# Patient Record
Sex: MALE | URBAN - METROPOLITAN AREA
[De-identification: names, ages, dates, MRNs, and addresses within clinical notes are randomized per-mention and may not be internally consistent; named-entity substitution may affect disease eponyms.]

---

## 2023-11-14 ENCOUNTER — TELEPHONE (OUTPATIENT)
Dept: ENT CLINIC | Age: 58
End: 2023-11-14

## 2023-11-14 NOTE — TELEPHONE ENCOUNTER
Patient called regarding a bill that he received, he was unsure if it was an estimate or what it was regarding. Also asked if his appt on 11/28 was for a consult or surgery. I called back and advised that what he received was an estimate by insurance and can be disregarded. Also advised that his appt is for a consult, that upon evaluation if his condition is something that can be treated in office we may be able to complete this day of if time allows and provider sees fit, or he will be scheduled for the procedure a different day. Patient noted understanding.

## 2023-11-27 NOTE — PROGRESS NOTES
11/28/23: New Pt here for right nasal mass. First notice 2 months ago, and has slowly grown larger. No pain. States has not interfered with breathing. No change in voice. Drinks 6-8 glasses of water, 2 cups of coffee, and socially drinks alcohol. Weight is stable.

## 2023-11-28 ENCOUNTER — OFFICE VISIT (OUTPATIENT)
Dept: ENT CLINIC | Age: 58
End: 2023-11-28
Payer: COMMERCIAL

## 2023-11-28 VITALS
WEIGHT: 226 LBS | BODY MASS INDEX: 26.68 KG/M2 | DIASTOLIC BLOOD PRESSURE: 77 MMHG | HEART RATE: 59 BPM | HEIGHT: 77 IN | SYSTOLIC BLOOD PRESSURE: 118 MMHG

## 2023-11-28 DIAGNOSIS — L98.0 PYOGENIC GRANULOMA OF SKIN: Primary | ICD-10-CM

## 2023-11-28 DIAGNOSIS — R22.9 SKIN MASS: ICD-10-CM

## 2023-11-28 PROCEDURE — G8484 FLU IMMUNIZE NO ADMIN: HCPCS | Performed by: OTOLARYNGOLOGY

## 2023-11-28 PROCEDURE — G8427 DOCREV CUR MEDS BY ELIG CLIN: HCPCS | Performed by: OTOLARYNGOLOGY

## 2023-11-28 PROCEDURE — 3017F COLORECTAL CA SCREEN DOC REV: CPT | Performed by: OTOLARYNGOLOGY

## 2023-11-28 PROCEDURE — G8419 CALC BMI OUT NRM PARAM NOF/U: HCPCS | Performed by: OTOLARYNGOLOGY

## 2023-11-28 PROCEDURE — 1036F TOBACCO NON-USER: CPT | Performed by: OTOLARYNGOLOGY

## 2023-11-28 PROCEDURE — 99204 OFFICE O/P NEW MOD 45 MIN: CPT | Performed by: OTOLARYNGOLOGY

## 2023-11-28 RX ORDER — ALLOPURINOL 300 MG/1
TABLET ORAL
COMMUNITY
Start: 2023-11-13

## 2023-11-28 NOTE — PATIENT INSTRUCTIONS
SURGERY:_____/_____/_____    Nothing to eat or drink after midnight the night before surgery unless surgery is at [facility] or otherwise instructed by the hospital.    DO NOT TAKE ANY ASPIRIN PRODUCTS 7 days prior to surgery. Tylenol only. No Advil, Motrin, Aleve, or Ibuprofen. IF YOU ARE ON BLOOD THINNERS (PLAVIX, COUMADIN, ELIQUIS ETC) THESE WILL ALSO NEED TO BE HELD. Any illegal drugs in your system (including Marijuana even if legally prescribed) will result in your surgery being cancelled. Please be sure to check with our office or the hospital on time frame for the drugs to be out of your system. SHOULD YOUR INSURANCE CHANGE AT ANY TIME YOU MUST CONTACT OUR OFFICE. FAILURE TO DO SO MAY RESULT IN YOUR SURGERY BEING RESCHEDULED OR YOU MAY BE CHARGED AS SELF-PAY. If you need FMLA or Short Term Disability paperwork completed for your surgery, please complete your portion, ensure your name and date of birth are on them and fax them to [phone] asap. Paperwork can take up to 2 weeks to be completed. If you have any questions or concerns regarding your surgery, please contact the Surgery Scheduler-[facility]  [phone]. If you need medical clearance, you are responsible to contact your physician(s) to schedule an appointment for clearance. If clearance is not completed within 30 days of your surgery it may be cancelled. Our office will fax the appropriate forms that need to be completed to your physician(s). The location of your surgery will call you the day prior to your surgery date to let you know what time you have to be there and any other details. (they usually don't call until late afternoon- early evening.)- IF YOU HAVE QUESTIONS REGARDING THE TIME OF YOUR SURGERY, PLEASE CALL THE FACILITY YOU ARE SCHEDULED AT.            [address] [place], [place] will call you a couple days prior to surgery and give you further instructions, if you have any

## 2023-12-08 ENCOUNTER — TELEPHONE (OUTPATIENT)
Dept: ENT CLINIC | Age: 58
End: 2023-12-08

## 2023-12-08 ENCOUNTER — PREP FOR PROCEDURE (OUTPATIENT)
Dept: ENT CLINIC | Age: 58
End: 2023-12-08

## 2023-12-08 DIAGNOSIS — L98.9 LESION OF SKIN OF NOSE: ICD-10-CM

## 2023-12-08 NOTE — TELEPHONE ENCOUNTER
Prior Authorization Form:      DEMOGRAPHICS:                     Patient Name:  Lorna Madrid  Patient :  1965            Insurance:  Payor: [employer] / Plan: [employer] [address] / Product Type: *No Product type* /   Insurance ID Number:    Payer/Plan Subscr  Sex Relation Sub. Ins. ID Effective Group Num   1.  [facility] 3[birth year]1968 Female Spouse [id number]                                    [address]         DIAGNOSIS & PROCEDURE:                       Procedure/Operation: RIGHT NASAL EXCISION OF SKIN LESION WITH LOCAL TISSUE ARRANGEMENT           CPT Code: 65325, 82064    Diagnosis:  NOSE SKIN LESION    ICD10 Code: L98.9    Location:  [facility]    Surgeon:  DR Fito Emanuel INFORMATION:                          Date: 24    Time: N/A              Anesthesia:  General                                                       Status:  Outpatient        Special Comments:  PATHOLOGY       Electronically signed by Corona Aldana MA on 2023 at 9:35 AM

## 2023-12-27 NOTE — PROGRESS NOTES
[facility] PRE-ADMISSION TESTING INSTRUCTIONS    The Preadmission Testing patient is instructed accordingly using the following criteria (check applicable):    ARRIVAL INSTRUCTIONS:  [x] Parking the day of Surgery is located in the Main Entrance lot.  Upon entering the door, make an immediate right to the surgery reception desk    [x] Bring photo ID and insurance card    [x] Bring in a copy of Living will or Durable Power of  papers.    [x] Please be sure to arrange for responsible adult to provide transportation to and from the hospital    [x] Please arrange for responsible adult to be with you for the 24 hour period post procedure due to having anesthesia    [x] If you awake am of surgery not feeling well or have temperature >100 please call [phone]    GENERAL INSTRUCTIONS:    [x] Nothing by mouth after midnight, including gum, candy, mints or water    [x] You may brush your teeth, but do not swallow any water    [] Take medications as instructed with 1-2 oz of water    [x] Stop herbal supplements and vitamins 5 days prior to procedure    [] Follow preop dosing of blood thinners per physician instructions    [] Take 1/2 dose of evening insulin, but no insulin after midnight    [] No oral diabetic medications after midnight    [] If diabetic and have low blood sugar or feel symptomatic, take 1-2oz apple juice only    [] Bring inhalers day of surgery    [] Bring C-PAP/ Bi-Pap day of surgery    [] Bring urine specimen day of surgery    [x] Shower or bath with soap, lather and rinse well, AM of Surgery, no lotion, powders or creams to surgical site    [] Follow bowel prep as instructed per surgeon    [x] No tobacco products within 24 hours of surgery     [x] No alcohol or illegal drug use within 24 hours of surgery.    [x] Jewelry, body piercing's, eyeglasses, contact lenses and dentures are not permitted into surgery (bring cases)      [x] Please do not wear any nail polish,  make up or hair products on the day of surgery    [x] You can expect a call the business day prior to procedure to notify you if your arrival time changes    [x] If you receive a survey after surgery we would greatly appreciate your comments    [] Parent/guardian of a minor must accompany their child and remain on the premises  the entire time they are under our care     [] Pediatric patients may bring favorite toy, blanket or comfort item with them    [] A caregiver or family member must remain with the patient during their stay if they are mentally handicapped, have dementia, disoriented or unable to use a call light or would be a safety concern if left unattended    [x] Please notify surgeon if you develop any illness between now and time of surgery (cold, cough, sore throat, fever, nausea, vomiting) or any signs of infections  including skin, wounds, and dental.    [x]  The Outpatient Pharmacy is available to fill your prescription here on your day of surgery, ask your preop nurse for details    [] Other instructions    EDUCATIONAL MATERIALS PROVIDED:    [] PAT Preoperative Education Packet/Booklet     [] Medication List    [] Transfusion bracelet applied with instructions    [] Shower with soap, lather and rinse well, and use CHG wipes provided the evening before surgery as instructed    [] Incentive spirometer with instructions

## 2024-01-04 ENCOUNTER — ANESTHESIA EVENT (OUTPATIENT)
Dept: OPERATING ROOM | Age: 59
End: 2024-01-04
Payer: COMMERCIAL

## 2024-01-04 ENCOUNTER — HOSPITAL ENCOUNTER (OUTPATIENT)
Age: 59
Setting detail: OUTPATIENT SURGERY
Discharge: HOME | End: 2024-01-04
Attending: OTOLARYNGOLOGY | Admitting: OTOLARYNGOLOGY
Payer: COMMERCIAL

## 2024-01-04 ENCOUNTER — ANESTHESIA (OUTPATIENT)
Dept: OPERATING ROOM | Age: 59
End: 2024-01-04
Payer: COMMERCIAL

## 2024-01-04 VITALS
HEIGHT: 77 IN | RESPIRATION RATE: 16 BRPM | DIASTOLIC BLOOD PRESSURE: 88 MMHG | WEIGHT: 226 LBS | TEMPERATURE: 96.8 F | SYSTOLIC BLOOD PRESSURE: 124 MMHG | BODY MASS INDEX: 26.68 KG/M2 | OXYGEN SATURATION: 99 % | HEART RATE: 51 BPM

## 2024-01-04 DIAGNOSIS — L98.9 LESION OF SKIN OF NOSE: ICD-10-CM

## 2024-01-04 DIAGNOSIS — G89.18 POST-OP PAIN: Primary | ICD-10-CM

## 2024-01-04 PROCEDURE — 2500000003 HC RX 250 WO HCPCS: Performed by: NURSE ANESTHETIST, CERTIFIED REGISTERED

## 2024-01-04 PROCEDURE — 7100000010 HC PHASE II RECOVERY - FIRST 15 MIN: Performed by: OTOLARYNGOLOGY

## 2024-01-04 PROCEDURE — 88305 TISSUE EXAM BY PATHOLOGIST: CPT

## 2024-01-04 PROCEDURE — 2580000003 HC RX 258: Performed by: STUDENT IN AN ORGANIZED HEALTH CARE EDUCATION/TRAINING PROGRAM

## 2024-01-04 PROCEDURE — 3700000000 HC ANESTHESIA ATTENDED CARE: Performed by: OTOLARYNGOLOGY

## 2024-01-04 PROCEDURE — 6360000002 HC RX W HCPCS: Performed by: NURSE ANESTHETIST, CERTIFIED REGISTERED

## 2024-01-04 PROCEDURE — 3600000003 HC SURGERY LEVEL 3 BASE: Performed by: OTOLARYNGOLOGY

## 2024-01-04 PROCEDURE — 3600000013 HC SURGERY LEVEL 3 ADDTL 15MIN: Performed by: OTOLARYNGOLOGY

## 2024-01-04 PROCEDURE — 2709999900 HC NON-CHARGEABLE SUPPLY: Performed by: OTOLARYNGOLOGY

## 2024-01-04 PROCEDURE — 6370000000 HC RX 637 (ALT 250 FOR IP)

## 2024-01-04 PROCEDURE — 7100000011 HC PHASE II RECOVERY - ADDTL 15 MIN: Performed by: OTOLARYNGOLOGY

## 2024-01-04 PROCEDURE — 14061 TIS TRNFR E/N/E/L10.1-30SQCM: CPT | Performed by: OTOLARYNGOLOGY

## 2024-01-04 PROCEDURE — 2500000003 HC RX 250 WO HCPCS: Performed by: OTOLARYNGOLOGY

## 2024-01-04 PROCEDURE — 2580000003 HC RX 258: Performed by: NURSE ANESTHETIST, CERTIFIED REGISTERED

## 2024-01-04 PROCEDURE — 6360000002 HC RX W HCPCS: Performed by: STUDENT IN AN ORGANIZED HEALTH CARE EDUCATION/TRAINING PROGRAM

## 2024-01-04 PROCEDURE — 3700000001 HC ADD 15 MINUTES (ANESTHESIA): Performed by: OTOLARYNGOLOGY

## 2024-01-04 RX ORDER — LIDOCAINE HYDROCHLORIDE 20 MG/ML
INJECTION, SOLUTION INFILTRATION; PERINEURAL PRN
Status: DISCONTINUED | OUTPATIENT
Start: 2024-01-04 | End: 2024-01-04 | Stop reason: SDUPTHER

## 2024-01-04 RX ORDER — SODIUM CHLORIDE 0.9 % (FLUSH) 0.9 %
5-40 SYRINGE (ML) INJECTION PRN
Status: DISCONTINUED | OUTPATIENT
Start: 2024-01-04 | End: 2024-01-04 | Stop reason: HOSPADM

## 2024-01-04 RX ORDER — SODIUM CHLORIDE 9 MG/ML
INJECTION, SOLUTION INTRAVENOUS CONTINUOUS PRN
Status: DISCONTINUED | OUTPATIENT
Start: 2024-01-04 | End: 2024-01-04 | Stop reason: SDUPTHER

## 2024-01-04 RX ORDER — DIPHENHYDRAMINE HYDROCHLORIDE 50 MG/ML
12.5 INJECTION INTRAMUSCULAR; INTRAVENOUS
Status: DISCONTINUED | OUTPATIENT
Start: 2024-01-04 | End: 2024-01-04 | Stop reason: HOSPADM

## 2024-01-04 RX ORDER — FENTANYL CITRATE 50 UG/ML
INJECTION, SOLUTION INTRAMUSCULAR; INTRAVENOUS PRN
Status: DISCONTINUED | OUTPATIENT
Start: 2024-01-04 | End: 2024-01-04 | Stop reason: SDUPTHER

## 2024-01-04 RX ORDER — ONDANSETRON 4 MG/1
4 TABLET, FILM COATED ORAL EVERY 6 HOURS
Qty: 12 TABLET | Refills: 0 | Status: SHIPPED | OUTPATIENT
Start: 2024-01-04 | End: 2024-01-07

## 2024-01-04 RX ORDER — SODIUM CHLORIDE 0.9 % (FLUSH) 0.9 %
5-40 SYRINGE (ML) INJECTION EVERY 12 HOURS SCHEDULED
Status: DISCONTINUED | OUTPATIENT
Start: 2024-01-04 | End: 2024-01-04 | Stop reason: HOSPADM

## 2024-01-04 RX ORDER — HYDROCODONE BITARTRATE AND ACETAMINOPHEN 5; 325 MG/1; MG/1
1 TABLET ORAL EVERY 6 HOURS PRN
Qty: 12 TABLET | Refills: 0 | Status: SHIPPED | OUTPATIENT
Start: 2024-01-04 | End: 2024-01-07

## 2024-01-04 RX ORDER — MEPERIDINE HYDROCHLORIDE 25 MG/ML
12.5 INJECTION INTRAMUSCULAR; INTRAVENOUS; SUBCUTANEOUS EVERY 5 MIN PRN
Status: DISCONTINUED | OUTPATIENT
Start: 2024-01-04 | End: 2024-01-04 | Stop reason: HOSPADM

## 2024-01-04 RX ORDER — CEFAZOLIN 2 G/1
INJECTION, POWDER, FOR SOLUTION INTRAMUSCULAR; INTRAVENOUS
Status: DISPENSED
Start: 2024-01-04 | End: 2024-01-04

## 2024-01-04 RX ORDER — MIDAZOLAM HYDROCHLORIDE 1 MG/ML
INJECTION INTRAMUSCULAR; INTRAVENOUS PRN
Status: DISCONTINUED | OUTPATIENT
Start: 2024-01-04 | End: 2024-01-04 | Stop reason: SDUPTHER

## 2024-01-04 RX ORDER — TRAMADOL HYDROCHLORIDE 50 MG/1
50 TABLET ORAL PRN
Status: DISCONTINUED | OUTPATIENT
Start: 2024-01-04 | End: 2024-01-04 | Stop reason: HOSPADM

## 2024-01-04 RX ORDER — PROPOFOL 10 MG/ML
INJECTION, EMULSION INTRAVENOUS CONTINUOUS PRN
Status: DISCONTINUED | OUTPATIENT
Start: 2024-01-04 | End: 2024-01-04 | Stop reason: SDUPTHER

## 2024-01-04 RX ORDER — SODIUM CHLORIDE 9 MG/ML
INJECTION, SOLUTION INTRAVENOUS PRN
Status: DISCONTINUED | OUTPATIENT
Start: 2024-01-04 | End: 2024-01-04 | Stop reason: HOSPADM

## 2024-01-04 RX ORDER — LIDOCAINE HYDROCHLORIDE AND EPINEPHRINE 10; 10 MG/ML; UG/ML
INJECTION, SOLUTION INFILTRATION; PERINEURAL PRN
Status: DISCONTINUED | OUTPATIENT
Start: 2024-01-04 | End: 2024-01-04 | Stop reason: ALTCHOICE

## 2024-01-04 RX ORDER — GLYCOPYRROLATE 0.2 MG/ML
INJECTION INTRAMUSCULAR; INTRAVENOUS PRN
Status: DISCONTINUED | OUTPATIENT
Start: 2024-01-04 | End: 2024-01-04 | Stop reason: SDUPTHER

## 2024-01-04 RX ORDER — TRAMADOL HYDROCHLORIDE 50 MG/1
100 TABLET ORAL PRN
Status: DISCONTINUED | OUTPATIENT
Start: 2024-01-04 | End: 2024-01-04 | Stop reason: HOSPADM

## 2024-01-04 RX ORDER — SODIUM CHLORIDE, SODIUM LACTATE, POTASSIUM CHLORIDE, CALCIUM CHLORIDE 600; 310; 30; 20 MG/100ML; MG/100ML; MG/100ML; MG/100ML
INJECTION, SOLUTION INTRAVENOUS CONTINUOUS
Status: DISCONTINUED | OUTPATIENT
Start: 2024-01-04 | End: 2024-01-04 | Stop reason: HOSPADM

## 2024-01-04 RX ADMIN — FENTANYL CITRATE 50 MCG: 50 INJECTION, SOLUTION INTRAMUSCULAR; INTRAVENOUS at 09:02

## 2024-01-04 RX ADMIN — PROPOFOL 70 MCG/KG/MIN: 10 INJECTION, EMULSION INTRAVENOUS at 09:02

## 2024-01-04 RX ADMIN — LIDOCAINE HYDROCHLORIDE 40 MG: 20 INJECTION, SOLUTION INFILTRATION; PERINEURAL at 09:02

## 2024-01-04 RX ADMIN — SODIUM CHLORIDE: 9 INJECTION, SOLUTION INTRAVENOUS at 08:53

## 2024-01-04 RX ADMIN — FENTANYL CITRATE 50 MCG: 50 INJECTION, SOLUTION INTRAMUSCULAR; INTRAVENOUS at 09:18

## 2024-01-04 RX ADMIN — WATER 2000 MG: 1 INJECTION INTRAMUSCULAR; INTRAVENOUS; SUBCUTANEOUS at 09:00

## 2024-01-04 RX ADMIN — GLYCOPYRROLATE 0.2 MG: 0.2 INJECTION INTRAMUSCULAR; INTRAVENOUS at 09:00

## 2024-01-04 RX ADMIN — MIDAZOLAM 2 MG: 1 INJECTION INTRAMUSCULAR; INTRAVENOUS at 08:55

## 2024-01-04 ASSESSMENT — PAIN - FUNCTIONAL ASSESSMENT: PAIN_FUNCTIONAL_ASSESSMENT: NONE - DENIES PAIN

## 2024-01-04 NOTE — H&P
Zya Alamo was seen and re-examined preoperatively today, January 4, 2024.  There was no substantial change in his physical and medical status. All Meds and Family/Social/Previous history was reviewed and there were no significant changes. Patient is fit for the proposed surgical procedure.  All questions were appropriately addressed and had no further questions regarding the risks, benefits, and alternatives of the procedure.  Zay Alamo and family wished to proceed.    Chapo Nunez DO  Resident Physician  [facility]  Otolaryngology Residency  1/4/2024  8:31 AM

## 2024-01-04 NOTE — ANESTHESIA PRE PROCEDURE
Department of Anesthesiology  Preprocedure Note       Name:  Zay Alamo   Age:  58 y.o.  :  1965                                          MRN:  [id number]         Date:  2024      Surgeon: Surgeon(s):  Uche Barnes MD    Procedure: Procedure(s):  RIGHT NASAL EXCISION OF SKIN LESION AND LOCAL TISSUE ARRANGEMENT   ++IODINE ALLERGY++    Medications prior to admission:   Prior to Admission medications    Medication Sig Start Date End Date Taking? Authorizing Provider   allopurinol (ZYLOPRIM) 300 MG tablet take 1 tablet by mouth daily with A LARGE GLASS OF WATER 23   Provider, MD Monik       Current medications:    Current Facility-Administered Medications   Medication Dose Route Frequency Provider Last Rate Last Admin    lactated ringers IV soln infusion   IntraVENous Continuous Chapo Nunez DO        sodium chloride flush 0.9 % injection 5-40 mL  5-40 mL IntraVENous 2 times per day Chapo Nunez,         sodium chloride flush 0.9 % injection 5-40 mL  5-40 mL IntraVENous PRN Chapo Nunez,         0.9 % sodium chloride infusion   IntraVENous PRN Chapo Nunez DO        ceFAZolin (ANCEF) 2,000 mg in sterile water 20 mL IV syringe  2,000 mg IntraVENous On Call to OR Chapo Nunez DO           Allergies:    Allergies   Allergen Reactions    Povidone-Iodine Hives       Problem List:    Patient Active Problem List   Diagnosis Code    Lesion of skin of nose L98.9       Past Medical History:        Diagnosis Date    Arthritis     Lesion of skin of nose        Past Surgical History:        Procedure Laterality Date    WISDOM TOOTH EXTRACTION         Social History:    Social History     Tobacco Use    Smoking status: Never    Smokeless tobacco: Never   Substance Use Topics    Alcohol use: Yes     Comment: socially                                Counseling given: Not Answered      Vital Signs (Current):   Vitals:    23 1556   Weight: 102.5 kg (226 lb)   Height:

## 2024-01-04 NOTE — DISCHARGE INSTRUCTIONS
Otolaryngology (ENT) Post-Op Instructions    Follow-up with Dr. Barnes  in one week(s). Please call office to schedule and confirm your appointment. Please follow the instructions below:    DIET INSTRUCTIONS:  Regular diet.     ACTIVITY INSTRUCTIONS:  Increase activity as tolerated    Elevate Head of bed   No heavy lifting or strenuous activity     No driving while taking pain medication    WOUND/DRESSING INSTRUCTIONS:  May shower, but avoid baths, swimming pools or hot tubes until your wound is healed   You may clean your wound with baby wipe or gently let water run over area in shower. Gently dab area to dry and keep steri strips in place until follow up appointment  You may also cover your wound(s) with hydrogels, hydrofibers, alginates, or soft silicone dressing  For best wound-healing and cosmetic results:  Use sunscreen of at least SPF 30 on your wound(s) when outdoor  Again, always keep wound(s) moist or wet with ointment  Sutures/Staples to be removed in the office   MEDICATION INSTRUCTIONS:  Take medication as prescribed.  When taking pain medications, you may experience dizziness or drowsiness.  Do not drink alcohol or drive when taking these medications.  You may take Ibuprofen (over the counter) as per directions for mild pain.  Do not take any other acetaminophen (Tylenol) products while taking your pain medication.  You may experience constipation while taking pain medication - You may take over the counter stool softeners: docuscate (Colace) or sennosides S (Senokot - S).     Call physician for any of the following or for questions/concerns:   Fever over 101° F    Redness, swelling, hardness or warmth at the wound site (s)  Unrelieved nausea/vomiting    Foul smelling or cloudy drainage at the wound site (s)   Unrelieved pain or increase in pain     Increase in shortness of breath

## 2024-01-04 NOTE — OP NOTE
Operative Note      Patient: Zay Alamo  YOB: 1965  MRN: [id number]    Date of Procedure: 1/4/2024    Pre-Op Diagnosis Codes:     * Lesion of skin of nose [L98.9]    Post-Op Diagnosis: Same       Procedure(s):  RIGHT NASAL EXCISION OF SKIN LESION AND LOCAL TISSUE ARRANGEMENT   ++IODINE ALLERGY++  Advancement flap 12 cm sq    Surgeon(s):  Uche Barnes MD    Assistant:   Surgical Assistant: Petra Helm  Resident: Chapo Nunez DO    Anesthesia: General    Estimated Blood Loss (mL): less than 50     Complications: None    Specimens:   ID Type Source Tests Collected by Time Destination   A : nasal lesion Tissue Tissue SURGICAL PATHOLOGY Uche Barnes MD 1/4/2024 0930        Implants:  * No implants in log *      Drains: * No LDAs found *    Findings: right nasolateralsupero pyogenic granuloma biopsy documented lesion with central pore and granulation like thickened tissue below pore that was excised.      Detailed Description of Procedure:     INDICATIONS FOR PROCEDURE nasal skin mass pos   The patient was taken to Operating Room , identified as Zay Alamo and the procedure verified  The patient was prepped and draped in sterile fashion and MAC anesthesia was used for the case. . An of relaxed skin tension lines were marked in preop and 5 cc of local injected into area with incision was made with the incision extending between two relaxed lines to include the central pore and a margin of normal tissue about 4x10 mm with extension down to muscle to include the white granulation like tissue deep to the pore included. Then undermined starting inferiorly and working superiorly to fully excise the area.  Could not be closed primarily so a back cut was made to remove a dog ear and the tissue undermined in all directions for a total of 3x4 cm (12 cm sq) and then closed with 4.0 monocryl deep dermal sutures and then 5.0 fast gut running locking for skin after Hemostasis controlled with  bipolar cautery.  . Bleeding was minimal and was treated with bipolar cautery.      Pt tolerated well.   Transferred to anesthesia care and then back to pacu in good state.     Electronically signed by MADAI PALMER MD on 1/4/2024 at 9:43 AM

## 2024-01-04 NOTE — H&P
History of Present Illness-   11/28/23: New Pt here for right nasal mass. First notice 2 months ago, and has slowly grown larger. No pain. States has not interfered with breathing. No Bleeding from the lesion and no pain from the lesion. Previous biopsy done by Dermatology 10/26/23 that resulted as pyogenic granuloma. No change in voice. Drinks 6-8 glasses of water, 2 cups of coffee, and socially drinks alcohol. Weight is stable. Non smoker.  No hormone replacement.     Past medical, family and social history were reviewed per the patient's chart    Review of Systems- No drainage, discharge, or headache.  Complete 10 system ROS completed and negative except as noted above.     Physical Examination-   Vital Signs-/77   Pulse 59   Ht 1.956 m (6' 5\")   Wt 102.5 kg (226 lb)   BMI 26.80 kg/m²     Ears- Tympanic membranes clear bilaterally.  No middle ear effusion.  No pre or post auricular tenderness.  Nose- Nasal mucosa clear and dry.  No significant septal deviation or inferior turbinate hypertrophy. + external lesion on right nare   Oral Cavity/Oropharynx- Floor of mouth and tongue are soft and nontender.  No posterior pharyngeal erythema. + gag reflex  Neck- Soft and nontender.  No masses, lesions, lymphadenopathy, or thyroid nodules appreciated.   Cranial Nerve- Cranial nerves II to XII intact.  Extraocular muscles intact.  No gross motor visual deficits.  No spontaneous nystagmus.    Face- No facial skin tenderness to palpation.  Heart- No cyanosis, regular  Lungs- No stridor, no intercostal accessory muscle use  General- The patient is in no acute distress. A&O x3       Medical Decision Making and Treatment Plan.     1. Plan today to treat the skin mass was to review available records- done- pyogenic granuloma  2. Reviewed anatomy and treatment options of the skin mass   3. Recommend an excision to finish his workup   4. Plan for fu after excision

## 2024-01-04 NOTE — ANESTHESIA POSTPROCEDURE EVALUATION
Department of Anesthesiology  Postprocedure Note    Patient: Zay Alamo  MRN: [id number]  YOB: 1965  Date of evaluation: 1/4/2024    Procedure Summary       Date: 01/04/24 Room / Location: [address]    Anesthesia Start: 0853 Anesthesia Stop: 1000    Procedure: RIGHT NASAL EXCISION OF SKIN LESION AND LOCAL TISSUE ARRANGEMENT   ++IODINE ALLERGY++ (Right: Nose) Diagnosis:       Lesion of skin of nose      (Lesion of skin of nose [L98.9])    Surgeons: Uche Barnes MD Responsible Provider: Benjy Stoll MD    Anesthesia Type: MAC ASA Status: 2            Anesthesia Type: No value filed.    Brooks Phase I: Brooks Score: 10    Brooks Phase II:      Anesthesia Post Evaluation    Patient location during evaluation: PACU (phase 2 pacu)  Patient participation: complete - patient participated  Level of consciousness: awake and alert  Airway patency: patent  Nausea & Vomiting: no nausea and no vomiting  Cardiovascular status: hemodynamically stable  Respiratory status: spontaneous ventilation and room air  Hydration status: stable  Pain management: adequate        No notable events documented.

## 2024-01-08 LAB — SURGICAL PATHOLOGY REPORT: NORMAL

## 2024-01-10 NOTE — PROGRESS NOTES
CLINICAL PHARMACY NOTE: MEDS TO BEDS    Total # of Prescriptions Filled: 2   The following medications were delivered to the patient:  Norco 7.5/325 mg    Additional Documentation:

## 2024-01-11 ENCOUNTER — OFFICE VISIT (OUTPATIENT)
Dept: ENT CLINIC | Age: 59
End: 2024-01-11

## 2024-01-11 VITALS
BODY MASS INDEX: 27.16 KG/M2 | HEIGHT: 77 IN | WEIGHT: 230 LBS | DIASTOLIC BLOOD PRESSURE: 84 MMHG | HEART RATE: 53 BPM | SYSTOLIC BLOOD PRESSURE: 125 MMHG

## 2024-01-11 DIAGNOSIS — L98.9 LESION OF SKIN OF NOSE: Primary | ICD-10-CM

## 2024-01-11 PROCEDURE — 99024 POSTOP FOLLOW-UP VISIT: CPT | Performed by: OTOLARYNGOLOGY

## 2024-01-11 NOTE — PROGRESS NOTES
Dear Dr Chin, Rolando Mahan, DO No ref. provider found     We had the pleasure of seeing Zay Alamo, 1965 here    on 1/11/2024  Please see below for review of care and plans.     Chief complaint- No diagnosis found.      1/4/24 - RIGHT NASAL EXCISION OF SKIN LESION AND LOCAL TISSUE ARRANGEMENT       History of Present Illness-   11/28/23: New Pt here for right nasal mass. First notice 2 months ago, and has slowly grown larger. No pain. States has not interfered with breathing. No Bleeding from the lesion and no pain from the lesion. Previous biopsy done by Dermatology 10/26/23 that resulted as pyogenic granuloma. No change in voice. Drinks 6-8 glasses of water, 2 cups of coffee, and socially drinks alcohol. Weight is stable. Non smoker.  No hormone replacement.     1/11/24: Pt here for post op right nasal excision of skin lesion. No pain. No difficulty swallowing. No change in voice. Drinks 6-8 glasses of water, 2 cups of coffee, and socially drinks alcohol. Weight is stable.    Review of Systems- No drainage, discharge, or headache.  Complete 10 system ROS completed and negative except as noted above.     Physical Examination-   Vital Signs-There were no vitals taken for this visit.    Ears- Tympanic membranes clear bilaterally.  No middle ear effusion.  No pre or post auricular tenderness.  Nose- Nasal mucosa clear and dry.  No significant septal deviation or inferior turbinate hypertrophy. + external lesion on right nare - well helaed site  Oral Cavity/Oropharynx- Floor of mouth and tongue are soft and nontender.  No posterior pharyngeal erythema. + gag reflex  Neck- Soft and nontender.  No masses, lesions, lymphadenopathy, or thyroid nodules appreciated.   Cranial Nerve- Cranial nerves II to XII intact.  Extraocular muscles intact.  No gross motor visual deficits.  No spontaneous nystagmus.    Face- No facial skin tenderness to palpation.  Heart- No cyanosis, regular  Lungs- No stridor, no

## 2024-01-11 NOTE — PATIENT INSTRUCTIONS
Rule of two    - Take two fingers and massage site in a circular motion for two minutes.     - Do it twice a day.

## 2024-03-26 ENCOUNTER — OFFICE VISIT (OUTPATIENT)
Dept: ENT CLINIC | Age: 59
End: 2024-03-26

## 2024-03-26 VITALS
BODY MASS INDEX: 26.92 KG/M2 | HEART RATE: 50 BPM | HEIGHT: 77 IN | WEIGHT: 228 LBS | DIASTOLIC BLOOD PRESSURE: 73 MMHG | SYSTOLIC BLOOD PRESSURE: 118 MMHG

## 2024-03-26 DIAGNOSIS — L98.9 LESION OF SKIN OF NOSE: Primary | ICD-10-CM

## 2024-03-26 PROCEDURE — 99024 POSTOP FOLLOW-UP VISIT: CPT | Performed by: OTOLARYNGOLOGY

## 2024-03-26 NOTE — PROGRESS NOTES
3/26/24: Pt here for nasal suture removal, had surface suture removed but are unsure if the ones internally are disolving properly. No pain. No difficulty swallowing. No change in voice. Drinks 6-8 glasses of water, 2 cups of coffee, and socially drinks alcohol. Weight is stable.

## 2024-03-26 NOTE — PROGRESS NOTES
Dear Dr Chin, Rolando Mahan, DO No ref. provider found     We had the pleasure of seeing Zay Alamo, 1965 here    on 3/26/2024  Please see below for review of care and plans.     Chief complaint- No diagnosis found.      1/4/24 - RIGHT NASAL EXCISION OF SKIN LESION AND LOCAL TISSUE ARRANGEMENT       History of Present Illness-   11/28/23: New Pt here for right nasal mass. First notice 2 months ago, and has slowly grown larger. No pain. States has not interfered with breathing. No Bleeding from the lesion and no pain from the lesion. Previous biopsy done by Dermatology 10/26/23 that resulted as pyogenic granuloma. No change in voice. Drinks 6-8 glasses of water, 2 cups of coffee, and socially drinks alcohol. Weight is stable. Non smoker.  No hormone replacement.     1/11/24: Pt here for post op right nasal excision of skin lesion. No pain. No difficulty swallowing. No change in voice. Drinks 6-8 glasses of water, 2 cups of coffee, and socially drinks alcohol. Weight is stable.    3/26/24: Pt here for nasal suture removal, had surface suture removed but are unsure if the ones internally are disolving properly. No pain. No difficulty swallowing. No change in voice. Drinks 6-8 glasses of water, 2 cups of coffee, and socially drinks alcohol. Weight is stable.     Review of Systems- No drainage, discharge, or headache.  Complete 10 system ROS completed and negative except as noted above.     Physical Examination-   Vital Signs-/73   Pulse 50   Ht 1.956 m (6' 5\")   Wt 103.4 kg (228 lb)   BMI 27.04 kg/m²     Ears- Tympanic membranes clear bilaterally.  No middle ear effusion.  No pre or post auricular tenderness.  Nose- Nasal mucosa clear and dry.  No significant septal deviation or inferior turbinate hypertrophy. + external lesion on right nare - well helaed site update 3/26/24: right nasal bridge with 2 separate 1 mm raised edges- used .5 pickups and scissors to open up area and found no

## 2024-12-17 ENCOUNTER — TELEPHONE (OUTPATIENT)
Dept: PULMONOLOGY | Age: 59
End: 2024-12-17

## 2024-12-17 ENCOUNTER — OFFICE VISIT (OUTPATIENT)
Dept: PULMONOLOGY | Age: 59
End: 2024-12-17
Payer: COMMERCIAL

## 2024-12-17 VITALS
HEART RATE: 71 BPM | HEIGHT: 77 IN | WEIGHT: 228 LBS | TEMPERATURE: 98.4 F | DIASTOLIC BLOOD PRESSURE: 76 MMHG | BODY MASS INDEX: 26.92 KG/M2 | OXYGEN SATURATION: 99 % | SYSTOLIC BLOOD PRESSURE: 120 MMHG

## 2024-12-17 DIAGNOSIS — G47.33 OSA (OBSTRUCTIVE SLEEP APNEA): ICD-10-CM

## 2024-12-17 DIAGNOSIS — J45.909 UNCOMPLICATED ASTHMA, UNSPECIFIED ASTHMA SEVERITY, UNSPECIFIED WHETHER PERSISTENT: Primary | ICD-10-CM

## 2024-12-17 PROBLEM — R73.9 HYPERGLYCEMIA: Status: ACTIVE | Noted: 2023-09-22

## 2024-12-17 PROBLEM — I34.0 MITRAL VALVE REGURGITATION: Status: ACTIVE | Noted: 2023-09-22

## 2024-12-17 PROBLEM — E78.5 HYPERLIPIDEMIA: Status: ACTIVE | Noted: 2023-09-22

## 2024-12-17 PROBLEM — I33.0 AORTIC VALVE VEGETATION: Status: ACTIVE | Noted: 2023-09-22

## 2024-12-17 PROCEDURE — 99203 OFFICE O/P NEW LOW 30 MIN: CPT | Performed by: INTERNAL MEDICINE

## 2024-12-17 PROCEDURE — G8427 DOCREV CUR MEDS BY ELIG CLIN: HCPCS | Performed by: INTERNAL MEDICINE

## 2024-12-17 PROCEDURE — G8484 FLU IMMUNIZE NO ADMIN: HCPCS | Performed by: INTERNAL MEDICINE

## 2024-12-17 PROCEDURE — 1036F TOBACCO NON-USER: CPT | Performed by: INTERNAL MEDICINE

## 2024-12-17 PROCEDURE — G8419 CALC BMI OUT NRM PARAM NOF/U: HCPCS | Performed by: INTERNAL MEDICINE

## 2024-12-17 PROCEDURE — 3017F COLORECTAL CA SCREEN DOC REV: CPT | Performed by: INTERNAL MEDICINE

## 2024-12-17 RX ORDER — IRBESARTAN 150 MG/1
150 TABLET ORAL DAILY
COMMUNITY
Start: 2024-12-08

## 2024-12-17 RX ORDER — AZITHROMYCIN 250 MG/1
250 TABLET, FILM COATED ORAL DAILY
COMMUNITY
Start: 2024-10-28

## 2024-12-17 RX ORDER — PREDNISONE 10 MG/1
10 TABLET ORAL DAILY
COMMUNITY
Start: 2024-11-07

## 2024-12-17 RX ORDER — DOXYCYCLINE 100 MG/1
100 CAPSULE ORAL DAILY
COMMUNITY
Start: 2024-10-17

## 2024-12-17 RX ORDER — IPRATROPIUM BROMIDE AND ALBUTEROL SULFATE 2.5; .5 MG/3ML; MG/3ML
1 SOLUTION RESPIRATORY (INHALATION) EVERY 4 HOURS PRN
COMMUNITY
Start: 2024-11-12

## 2024-12-17 RX ORDER — ATORVASTATIN CALCIUM 20 MG/1
20 TABLET, FILM COATED ORAL DAILY
COMMUNITY
Start: 2024-12-16

## 2024-12-17 RX ORDER — ALBUTEROL SULFATE 90 UG/1
2 INHALANT RESPIRATORY (INHALATION) EVERY 4 HOURS PRN
COMMUNITY
Start: 2024-10-28

## 2024-12-17 RX ORDER — FLUTICASONE FUROATE, UMECLIDINIUM BROMIDE AND VILANTEROL TRIFENATATE 100; 62.5; 25 UG/1; UG/1; UG/1
1 POWDER RESPIRATORY (INHALATION) DAILY
COMMUNITY

## 2024-12-17 RX ORDER — FLUTICASONE PROPIONATE AND SALMETEROL 250; 50 UG/1; UG/1
1 POWDER RESPIRATORY (INHALATION) 2 TIMES DAILY
COMMUNITY
Start: 2024-11-12

## 2024-12-17 ASSESSMENT — ENCOUNTER SYMPTOMS
COUGH: 1
WHEEZING: 1
EYES NEGATIVE: 1
SHORTNESS OF BREATH: 1
GASTROINTESTINAL NEGATIVE: 1

## 2024-12-17 NOTE — TELEPHONE ENCOUNTER
Mailed letter to patient to inform him of the PFT that is scheduled for him  at [place], [place] . This test is scheduled on Monday, January 6, 2025 at  7:30 am. Please arrive 15 minutes prior to appointment time. The prep for this test is to not have any caffeine for 24 hours prior to testing and no respiratory medications for at least 4 hours prior to testing time.

## 2024-12-17 NOTE — PROGRESS NOTES
Progress Note    Zay Alamo  1965    Shortness of Breath       HPI     Past Medical History:   Diagnosis Date    Arthritis     Lesion of skin of nose       Past Surgical History:   Procedure Laterality Date    NOSE SURGERY Right 1/4/2024    RIGHT NASAL EXCISION OF SKIN LESION AND LOCAL TISSUE ARRANGEMENT performed by Uche Barnes MD at [facility]    WISDOM TOOTH EXTRACTION        No family history on file.   Social History     Socioeconomic History    Marital status: Unknown     Spouse name: None    Number of children: None    Years of education: None    Highest education level: None   Tobacco Use    Smoking status: Never    Smokeless tobacco: Never   Vaping Use    Vaping status: Never Used   Substance and Sexual Activity    Alcohol use: Yes     Comment: socially    Drug use: Never    Sexual activity: Defer        Povidone-iodine     Current Outpatient Medications:     albuterol sulfate HFA (PROVENTIL;VENTOLIN;PROAIR) 108 (90 Base) MCG/ACT inhaler, Inhale 2 puffs into the lungs every 4 hours as needed for Wheezing or Shortness of Breath, Disp: , Rfl:     atorvastatin (LIPITOR) 20 MG tablet, Take 1 tablet by mouth daily, Disp: , Rfl:     azithromycin (ZITHROMAX) 250 MG tablet, Take 1 tablet by mouth daily, Disp: , Rfl:     doxycycline hyclate (VIBRAMYCIN) 100 MG capsule, Take 1 capsule by mouth daily, Disp: , Rfl:     ipratropium 0.5 mg-albuterol 2.5 mg (DUONEB) 0.5-2.5 (3) MG/3ML SOLN nebulizer solution, Take 3 mLs by nebulization every 4 hours as needed, Disp: , Rfl:     fluticasone-salmeterol (ADVAIR) 250-50 MCG/ACT AEPB diskus inhaler, Inhale 1 puff into the lungs 2 times daily, Disp: , Rfl:     predniSONE (DELTASONE) 10 MG tablet, Take 1 tablet by mouth daily, Disp: , Rfl:     irbesartan (AVAPRO) 150 MG tablet, Take 1 tablet by mouth daily, Disp: , Rfl:     allopurinol (ZYLOPRIM) 300 MG tablet, take 1 tablet by mouth daily with A LARGE GLASS OF WATER, Disp: , Rfl:     fluticasone-umeclidin-vilant 
are equal, round, and reactive to light.   Cardiovascular:      Rate and Rhythm: Normal rate and regular rhythm.      Pulses: Normal pulses.      Heart sounds: Normal heart sounds.   Pulmonary:      Effort: Pulmonary effort is normal.      Breath sounds: Normal breath sounds.   Abdominal:      General: Bowel sounds are normal.      Palpations: Abdomen is soft.   Musculoskeletal:         General: Normal range of motion.      Cervical back: Normal range of motion and neck supple.   Skin:     General: Skin is warm.   Neurological:      General: No focal deficit present.      Mental Status: He is alert and oriented to person, place, and time.          Assessment/Plan:   Diagnosis Orders   1. Uncomplicated asthma, unspecified asthma severity, unspecified whether persistent  Full PFT Study With Bronchodilator      2. TERRIE (obstructive sleep apnea)          Continue Advair inhaler, PFT ordered, will review his HSAT and then decide about PAP therapy.    Follow up in 4 weeks.     Electronically by Katie Portillo MD  on 12/17/24 at 2:41 PM EST

## 2024-12-18 DIAGNOSIS — G47.33 OSA (OBSTRUCTIVE SLEEP APNEA): Primary | ICD-10-CM

## 2025-01-02 ENCOUNTER — TELEPHONE (OUTPATIENT)
Dept: PULMONOLOGY | Age: 60
End: 2025-01-02

## 2025-01-02 NOTE — TELEPHONE ENCOUNTER
Pt called in to see if we received the PFT that he had done 2 months ago. I advised the pt we received it and scanned it into his chart (media). He is wondering if he needs to have the PFT done that was ordered for him since he just had one done? He would like a call back ASAP to know if he needs to go to his PFT appt on 1/6/25.     Pt also stated that [facility] contacted him regarding his sleep mask and won't issue one to him unless there is a second diagnosis code listed. Please advise.

## 2025-01-03 ENCOUNTER — TELEPHONE (OUTPATIENT)
Dept: ENT CLINIC | Age: 60
End: 2025-01-03

## 2025-01-13 ENCOUNTER — TELEPHONE (OUTPATIENT)
Dept: PULMONOLOGY | Age: 60
End: 2025-01-13

## 2025-01-13 NOTE — TELEPHONE ENCOUNTER
Call to pt's PCP requesting additional medical records to support need for sleep study. Pt's insurance requiring additional diagnosis for billing of CPAP device and supplies. Emily called and is requesting additional dx due to AHI requirements. Left VM for PCP Dr Chin's office asking for call back or faxing of additional medical records needed.

## 2025-01-28 ENCOUNTER — OFFICE VISIT (OUTPATIENT)
Dept: PULMONOLOGY | Age: 60
End: 2025-01-28
Payer: COMMERCIAL

## 2025-01-28 VITALS
SYSTOLIC BLOOD PRESSURE: 113 MMHG | OXYGEN SATURATION: 97 % | HEART RATE: 65 BPM | TEMPERATURE: 97.5 F | DIASTOLIC BLOOD PRESSURE: 65 MMHG | RESPIRATION RATE: 12 BRPM

## 2025-01-28 DIAGNOSIS — J44.9 CHRONIC OBSTRUCTIVE PULMONARY DISEASE, UNSPECIFIED COPD TYPE (HCC): Primary | ICD-10-CM

## 2025-01-28 PROCEDURE — G8427 DOCREV CUR MEDS BY ELIG CLIN: HCPCS | Performed by: INTERNAL MEDICINE

## 2025-01-28 PROCEDURE — 3017F COLORECTAL CA SCREEN DOC REV: CPT | Performed by: INTERNAL MEDICINE

## 2025-01-28 PROCEDURE — 3023F SPIROM DOC REV: CPT | Performed by: INTERNAL MEDICINE

## 2025-01-28 PROCEDURE — G8419 CALC BMI OUT NRM PARAM NOF/U: HCPCS | Performed by: INTERNAL MEDICINE

## 2025-01-28 PROCEDURE — 99213 OFFICE O/P EST LOW 20 MIN: CPT | Performed by: INTERNAL MEDICINE

## 2025-01-28 PROCEDURE — 1036F TOBACCO NON-USER: CPT | Performed by: INTERNAL MEDICINE

## 2025-01-28 NOTE — PROGRESS NOTES
Progress Note    Zay Alamo  1965    CC:Follow-up (6 week follow up)       HPI : 59 year old male has been on PAP therapy for TERRIE, average use is 5 1/2 hours a night, AHI 4.3 and median mask leak is 3.5. He is getting used to PAP therapy and still has some day time sleepiness.     Past Medical History:   Diagnosis Date    Arthritis     Lesion of skin of nose       Past Surgical History:   Procedure Laterality Date    NOSE SURGERY Right 1/4/2024    RIGHT NASAL EXCISION OF SKIN LESION AND LOCAL TISSUE ARRANGEMENT performed by Uche Barnes MD at [facility] OR    WISDOM TOOTH EXTRACTION        No family history on file.   Social History     Socioeconomic History    Marital status: Unknown     Spouse name: None    Number of children: None    Years of education: None    Highest education level: None   Tobacco Use    Smoking status: Never    Smokeless tobacco: Never   Vaping Use    Vaping status: Never Used   Substance and Sexual Activity    Alcohol use: Yes     Comment: socially    Drug use: Never    Sexual activity: Defer        Povidone-iodine     Current Outpatient Medications:     albuterol sulfate HFA (PROVENTIL;VENTOLIN;PROAIR) 108 (90 Base) MCG/ACT inhaler, Inhale 2 puffs into the lungs every 4 hours as needed for Wheezing or Shortness of Breath, Disp: , Rfl:     atorvastatin (LIPITOR) 20 MG tablet, Take 1 tablet by mouth daily, Disp: , Rfl:     ipratropium 0.5 mg-albuterol 2.5 mg (DUONEB) 0.5-2.5 (3) MG/3ML SOLN nebulizer solution, Take 3 mLs by nebulization every 4 hours as needed, Disp: , Rfl:     fluticasone-salmeterol (ADVAIR) 250-50 MCG/ACT AEPB diskus inhaler, Inhale 1 puff into the lungs 2 times daily, Disp: , Rfl:     irbesartan (AVAPRO) 150 MG tablet, Take 1 tablet by mouth daily, Disp: , Rfl:     allopurinol (ZYLOPRIM) 300 MG tablet, take 1 tablet by mouth daily with A LARGE GLASS OF WATER, Disp: , Rfl:     azithromycin (ZITHROMAX) 250 MG tablet, Take 1 tablet by mouth daily (Patient not taking:

## 2025-07-29 ENCOUNTER — OFFICE VISIT (OUTPATIENT)
Age: 60
End: 2025-07-29
Payer: COMMERCIAL

## 2025-07-29 VITALS
OXYGEN SATURATION: 97 % | RESPIRATION RATE: 18 BRPM | SYSTOLIC BLOOD PRESSURE: 103 MMHG | HEIGHT: 77 IN | TEMPERATURE: 97.8 F | BODY MASS INDEX: 26.57 KG/M2 | HEART RATE: 56 BPM | WEIGHT: 225 LBS | DIASTOLIC BLOOD PRESSURE: 67 MMHG

## 2025-07-29 DIAGNOSIS — J45.909 UNCOMPLICATED ASTHMA, UNSPECIFIED ASTHMA SEVERITY, UNSPECIFIED WHETHER PERSISTENT: ICD-10-CM

## 2025-07-29 DIAGNOSIS — G47.33 OSA (OBSTRUCTIVE SLEEP APNEA): Primary | ICD-10-CM

## 2025-07-29 PROCEDURE — 99214 OFFICE O/P EST MOD 30 MIN: CPT | Performed by: INTERNAL MEDICINE

## 2025-07-29 PROCEDURE — 1036F TOBACCO NON-USER: CPT | Performed by: INTERNAL MEDICINE

## 2025-07-29 PROCEDURE — G8419 CALC BMI OUT NRM PARAM NOF/U: HCPCS | Performed by: INTERNAL MEDICINE

## 2025-07-29 PROCEDURE — G8427 DOCREV CUR MEDS BY ELIG CLIN: HCPCS | Performed by: INTERNAL MEDICINE

## 2025-07-29 PROCEDURE — 3017F COLORECTAL CA SCREEN DOC REV: CPT | Performed by: INTERNAL MEDICINE

## 2025-07-29 NOTE — PROGRESS NOTES
Progress Note    Zay Bartholomewmarco  1965    CC:Follow-up (SOB with activity)       HPI: 60 year old male with TERRIE and has been compliant with CPAP, average use is 5:50 hours a night, AHI 1.6 and Median mask leak is 3.9. Sleep quality is better and no day time sleep symptoms. History of asthma, occasional sob  on exertion, seldom uses rescue inhaler.     Past Medical History:   Diagnosis Date    Arthritis     Lesion of skin of nose       Past Surgical History:   Procedure Laterality Date    NOSE SURGERY Right 1/4/2024    RIGHT NASAL EXCISION OF SKIN LESION AND LOCAL TISSUE ARRANGEMENT performed by Uche Barnes MD at [facility] OR    WISDOM TOOTH EXTRACTION        No family history on file.   Social History     Socioeconomic History    Marital status: Unknown     Spouse name: None    Number of children: None    Years of education: None    Highest education level: None   Tobacco Use    Smoking status: Never    Smokeless tobacco: Never   Vaping Use    Vaping status: Never Used   Substance and Sexual Activity    Alcohol use: Yes     Comment: socially    Drug use: Never    Sexual activity: Defer     Social Drivers of Health      Received from [facility] [place] ([facility] [place])    Transportation        Povidone-iodine     Current Outpatient Medications:     albuterol sulfate HFA (PROVENTIL;VENTOLIN;PROAIR) 108 (90 Base) MCG/ACT inhaler, Inhale 2 puffs into the lungs every 4 hours as needed for Wheezing or Shortness of Breath, Disp: , Rfl:     atorvastatin (LIPITOR) 20 MG tablet, Take 1 tablet by mouth daily, Disp: , Rfl:     irbesartan (AVAPRO) 150 MG tablet, Take 1 tablet by mouth daily, Disp: , Rfl:     allopurinol (ZYLOPRIM) 300 MG tablet, take 1 tablet by mouth daily with A LARGE GLASS OF WATER, Disp: , Rfl:     ipratropium 0.5 mg-albuterol 2.5 mg (DUONEB) 0.5-2.5 (3) MG/3ML SOLN nebulizer solution, Take 3 mLs by nebulization every 4 hours as needed (Patient not taking: Reported on

## (undated) DEVICE — ELECTRODE PT RET AD L9FT HI MOIST COND ADH HYDRGEL CORDED

## (undated) DEVICE — 4-PORT MANIFOLD: Brand: NEPTUNE 2

## (undated) DEVICE — BASIC SINGLE BASIN 1-LF: Brand: MEDLINE INDUSTRIES, INC.

## (undated) DEVICE — ELECTRODE ELECSURG NDL 2.8 INX7.2 CM COAT INSUL EDGE

## (undated) DEVICE — NEEDLE FLTR 18GA L1.5IN MEM THK5UM BLNT DISP

## (undated) DEVICE — BANDAGE TRANSPARENT LIQ 2/3 CC MASTISOL

## (undated) DEVICE — STRIP,CLOSURE,WOUND,MEDI-STRIP,1/4X3: Brand: MEDLINE

## (undated) DEVICE — TOWEL,OR,DSP,ST,BLUE,STD,6/PK,12PK/CS: Brand: MEDLINE

## (undated) DEVICE — MARKER,SKIN,WI/RULER AND LABELS: Brand: MEDLINE

## (undated) DEVICE — TUBING SUCT 12FR MAL ALUM SHFT FN CAP VENT UNIV CONN W/ OBT

## (undated) DEVICE — SET SURG BASIN MAYO REUSABLE

## (undated) DEVICE — APPLICATOR  COTTON-TIPPED 6 IN WOOD STRL

## (undated) DEVICE — GLOVE ORANGE PI 8 1/2   MSG9085

## (undated) DEVICE — COVER HNDL LT DISP

## (undated) DEVICE — PACK PROCEDURE SURG GEN CUST

## (undated) DEVICE — SOLUTION IRRIGATION BAL SALT SOLUTION 15 ML STRL BSS

## (undated) DEVICE — GAUZE,SPONGE,4"X4",8PLY,STRL,LF,10/TRAY: Brand: MEDLINE

## (undated) DEVICE — SURGICAL PROCEDURE PACK EENT CUST